# Patient Record
Sex: FEMALE | Race: WHITE
[De-identification: names, ages, dates, MRNs, and addresses within clinical notes are randomized per-mention and may not be internally consistent; named-entity substitution may affect disease eponyms.]

---

## 2017-02-24 ENCOUNTER — HOSPITAL ENCOUNTER (OUTPATIENT)
Dept: HOSPITAL 47 - RADMAMWWP | Age: 47
Discharge: HOME | End: 2017-02-24
Payer: COMMERCIAL

## 2017-02-24 DIAGNOSIS — Z12.31: Primary | ICD-10-CM

## 2017-02-28 NOTE — MM
Reason for exam: screening  (asymptomatic).

Last mammogram was performed 1 year and 2 months ago.



History:

Taking estrogen beginning at age 46.  Taking other hormone beginning at age 46.



Physical Findings:

A clinical breast exam by your physician is recommended on an annual basis and 

results should be correlated with mammographic findings.



MG Screening Mammo w CAD

Bilateral CC and MLO view(s) were taken.

Prior study comparison: December 29, 2015, bilateral MG screening mammo w CAD.  

November 7, 2014, bilateral MG screening mammo w CAD.

There are scattered fibroglandular densities.  No significant changes when 

compared with prior studies.





ASSESSMENT: Negative, BI-RAD 1



RECOMMENDATION:

Routine screening mammogram of both breasts in 1 year.

## 2017-04-04 ENCOUNTER — HOSPITAL ENCOUNTER (OUTPATIENT)
Dept: HOSPITAL 47 - RADUSWWP | Age: 47
Discharge: HOME | End: 2017-04-04
Payer: COMMERCIAL

## 2017-04-04 DIAGNOSIS — M79.662: Primary | ICD-10-CM

## 2017-04-04 NOTE — US
EXAMINATION TYPE: US venous doppler duplex LE LT

 

DATE OF EXAM: 4/4/2017 8:36 AM

 

COMPARISON: NONE

 

CLINICAL HISTORY: Left Calf Pain, M79.669. No h/o dvt

 

SIDE PERFORMED: Left  

 

VESSELS IMAGED:

External Iliac Vein (EIV)

Common Femoral Vein

Deep Femoral Vein

Greater Saphenous Vein *

Femoral Vein

Popliteal Vein

Small Saphenous Vein *

Proximal Calf Veins

(* superficial vessels)

 

Left Leg:  Appears negative for DVT

**tech impression given to office @ 8:35

 

No popliteal fossa lesion is seen.

 

IMPRESSION:  

 

EXAMINATION IS NEGATIVE FOR DVT WITHIN THE LEFT LEG.

## 2017-09-14 ENCOUNTER — HOSPITAL ENCOUNTER (OUTPATIENT)
Dept: HOSPITAL 47 - RADCTMAIN | Age: 47
Discharge: HOME | End: 2017-09-14
Payer: COMMERCIAL

## 2017-09-14 DIAGNOSIS — R11.0: ICD-10-CM

## 2017-09-14 DIAGNOSIS — R10.11: Primary | ICD-10-CM

## 2017-09-14 DIAGNOSIS — R10.30: ICD-10-CM

## 2017-09-14 PROCEDURE — 74177 CT ABD & PELVIS W/CONTRAST: CPT

## 2017-09-14 NOTE — CT
EXAMINATION TYPE: CT abdomen pelvis w con

 

DATE OF EXAM: 9/14/2017

 

COMPARISON: 4/3/2014

 

HISTORY: Mid to right sided pain

 

CT DLP: 598.9 mGycm

Automated exposure control for dose reduction was used.

 

TECHNIQUE:  Helical acquisition of images was performed from the lung bases through the pelvis.

 

CONTRAST: 

Performed with Oral Contrast and with IV Contrast, patient injected with 100 mL of Omnipaque 300.

 

FINDINGS: 

 

Lung bases are clear. There is no pleural effusion. There is no pericardial effusion.

 

Liver spleen pancreas appear normal. Bile ducts are not dilated. There are clips from cholecystectomy
.

 

There is no adrenal mass. Kidneys show satisfactory contrast opacification. There is no hydronephrosi
s. There is no retroperitoneal adenopathy. There is no ascites. Bladder distends smoothly. I see no i
ntestinal wall thickening. There are no dilated loops. Appendix appears normal.

 

I see no bony destructive process.

IMPRESSION: 

NEGATIVE CT SCAN OF THE ABDOMEN AND PELVIS. NORMAL APPENDIX. NO CHANGE COMPARED TO OLD EXAM.

## 2017-09-29 ENCOUNTER — HOSPITAL ENCOUNTER (OUTPATIENT)
Dept: HOSPITAL 47 - RADUSWWP | Age: 47
Discharge: HOME | End: 2017-09-29
Payer: COMMERCIAL

## 2017-09-29 DIAGNOSIS — M79.9: Primary | ICD-10-CM

## 2017-09-29 NOTE — US
EXAMINATION TYPE: US mass soft tissue chest/back

 

DATE OF EXAM: 9/29/2017

 

COMPARISON: NONE

 

CLINICAL HISTORY: M799 Soft tissue [id number] Soft tissue disorder. Patient states feeling two palp
able lumps near right clavicle.  Largest has felt for about one month.  Second area comes and goes. 

 

Area of lumps scanned.  Superficial cluster of cystic lesions seen = 1.6 x 1.2 x 0.7 cm.  Contralater
al image taken.  

 

Second area of concern appears within normal limits.   

 

IMPRESSION:  Cluster of cystic lesions versus a single cystic lesion with a septation measuring 1.6 c
m near the right clavicle. This could represent necrotic adenopathy, resolving hematoma/seroma, or le
ss likely abscesses as no internal vascularity is present. Further evaluation with CT neck/chest with
 and without contrast is recommended for characterization.

## 2017-10-12 ENCOUNTER — HOSPITAL ENCOUNTER (OUTPATIENT)
Dept: HOSPITAL 47 - RADCTMAIN | Age: 47
Discharge: HOME | End: 2017-10-12
Payer: COMMERCIAL

## 2017-10-12 DIAGNOSIS — E04.1: Primary | ICD-10-CM

## 2017-10-12 DIAGNOSIS — D16.7: ICD-10-CM

## 2017-10-12 PROCEDURE — 70491 CT SOFT TISSUE NECK W/DYE: CPT

## 2017-10-12 PROCEDURE — 71260 CT THORAX DX C+: CPT

## 2017-10-12 NOTE — CT
EXAMINATION TYPE: CT neck chest w con

 

DATE OF EXAM: 10/12/2017 7:23 PM

 

COMPARISON: NONE

 

HISTORY: Masses to right clavicular region marked by BBs.

 

CT DLP: 1132.00 mGycm

Automated exposure control for dose reduction was used.

 

CONTRAST: 

CT scan of the neck is performed following with IV Contrast, patient injected with 100 mL of Omnipaqu
e 300.  Axial images are obtained, coronal and sagittal reformatted images are reviewed.

 

FINDINGS:

 

The parotid glands are symmetric. Submandibular salivary glands are symmetric. There is no sign of ph
aryngeal mass. There is a 1 cm cyst in the posterior left thyroid lobe. Trachea appears normal. There
 is normal contrast opacification of the carotid arteries and jugular veins. There is normal contrast
 opacification of the vertebral arteries. The tonsils and adenoids appear normal.

 

The lungs are clear of infiltrate. There is no evidence of a pulmonary mass. There is no pleural effu
merry. Heart size is normal. There is no mediastinal adenopathy. There is no sign of aortic aneurysm o
r dissection. Ascending aorta is top normal in size and measures 3.5 cm. There are markers placed ove
r the anterior lateral right clavicle. I see no bony destructive process. The shoulder joint appears 
intact. There is no evidence of axillary adenopathy. I see no evidence of a fracture.

 

IMPRESSION:  Small cyst in the left thyroid lobe. No discrete neck mass.

 

No evidence of abnormality associated with the right clavicle. negative CT scan of the chest.

## 2017-11-30 ENCOUNTER — HOSPITAL ENCOUNTER (OUTPATIENT)
Dept: HOSPITAL 47 - RADMAMWWP | Age: 47
End: 2017-11-30
Payer: COMMERCIAL

## 2017-11-30 DIAGNOSIS — Z53.9: Primary | ICD-10-CM

## 2018-02-09 ENCOUNTER — HOSPITAL ENCOUNTER (OUTPATIENT)
Dept: HOSPITAL 47 - RADUSWWP | Age: 48
Discharge: HOME | End: 2018-02-09
Payer: COMMERCIAL

## 2018-02-09 DIAGNOSIS — E04.2: Primary | ICD-10-CM

## 2018-02-09 PROCEDURE — 76536 US EXAM OF HEAD AND NECK: CPT

## 2018-02-09 NOTE — US
EXAMINATION TYPE: US thyroid st tissue head/neck

 

DATE OF EXAM: 2/9/2018

 

COMPARISON: NONE

 

CLINICAL HISTORY: E06.9 Thyroiditis unspecified E04.1 Thyroid nodule. Thyroid nodule seen on recent C
T

 

GLAND SIZE:

 

Right Lobe: 5.1 x 1.6 x 1.8 cm

** Overall Parenchyma:  heterogenous

Left Lobe: 5.3 x 1.6 x 1.5 cm

** Overall Parenchyma:  heterogeneous

Isthmus Thickness:  0.3 cm

 

NODULES

 

RIGHT:   # of nodules measured on right: 1

1.  0.9 X 0.7  x 0.7 cm  solid nodule at the mid pole with well-defined margins.  This nodule is wide
r than tall and shows intranodular vascularity. This is hyperechoic

** Prior size: no prior 

 

 

LEFT:    # of nodules measured on left:  1

1.  1.0 X 1.1  x 1.1 cm mixed nodule at the mid pole with well-defined margins.  This nodule is wider
 than tall and shows intranodular vascularity.

** Prior size: no prior 

 

 

ISTHMUS:    # of nodules measured in the isthmus:  0

 

 

Bilateral neck scanned, no evidence of lymphadenopathy.

 

Extremely heterogeneous thyroid gland. 1 nodule measured bilaterally. 

 

 

 

IMPRESSION:

 

1. There is a nodule within the left lobe thyroid greater than 1 cm. The right lobe thyroid nodule me
asures less than 1 cm. 

2.  Heterogenous appearing thyroid.

## 2018-02-16 ENCOUNTER — HOSPITAL ENCOUNTER (OUTPATIENT)
Dept: HOSPITAL 47 - RADPROMAIN | Age: 48
Discharge: HOME | End: 2018-02-16
Payer: COMMERCIAL

## 2018-02-16 DIAGNOSIS — E04.1: Primary | ICD-10-CM

## 2018-02-16 PROCEDURE — 76942 ECHO GUIDE FOR BIOPSY: CPT

## 2018-02-16 PROCEDURE — 88305 TISSUE EXAM BY PATHOLOGIST: CPT

## 2018-02-16 PROCEDURE — 10022: CPT

## 2018-02-16 PROCEDURE — 88173 CYTOPATH EVAL FNA REPORT: CPT

## 2018-02-16 NOTE — US
EXAMINATION TYPE: US FNA thyroid

 

DATE OF EXAM: 2/16/2018

 

COMPARISON: NONE

 

HISTORY: Thyroid nodule on the left.

 

Maximal barrier technique was utilized.  After informed consent, skin overlying the lesion was locali
zed with ultrasound and the overlying skin prepped and draped. Ultrasound was utilized using sterile 
technique. Lidocaine was used for local anesthesia.  4 passes with a 25-gauge needle were made into t
he nodule and aspirated specimen was submitted to cytology.  Following the procedure hemostasis achie
sienna.  No immediate complication.  The patient discharged in stable condition.

 

IMPRESSION: STATUS POST ULTRASOUND GUIDED FINE NEEDLE ASPIRATION OF THYROID NODULE, PATHOLOGY IS PEND
ING.  THIS PROCEDURE WAS PERFORMED BY THE UNDERSIGNED.

## 2018-02-16 NOTE — US
EXAMINATION TYPE: US FNA thyroid

 

DATE OF EXAM: 2/16/2018

 

COMPARISON: NONE

 

HISTORY: Thyroid nodule on the right.

 

Maximal barrier technique was utilized.  After informed consent, skin overlying the lesion was locali
zed with ultrasound and the overlying skin prepped and draped. Ultrasound was utilized using sterile 
technique. Lidocaine was used for local anesthesia.  4 passes with a 25-gauge needle were made into t
he nodule and aspirated specimen was submitted to cytology.  Following the procedure hemostasis achie
sienna.  No immediate complication.  The patient discharged in stable condition.

 

IMPRESSION: STATUS POST ULTRASOUND GUIDED FINE NEEDLE ASPIRATION OF THYROID NODULE, PATHOLOGY IS PEND
ING.  THIS PROCEDURE WAS PERFORMED BY THE UNDERSIGNED.

## 2018-10-24 ENCOUNTER — HOSPITAL ENCOUNTER (OUTPATIENT)
Dept: HOSPITAL 47 - RADMAMWWP | Age: 48
Discharge: HOME | End: 2018-10-24
Attending: OBSTETRICS & GYNECOLOGY
Payer: COMMERCIAL

## 2018-10-24 DIAGNOSIS — Z12.31: Primary | ICD-10-CM

## 2018-10-24 PROCEDURE — 77067 SCR MAMMO BI INCL CAD: CPT

## 2018-10-24 NOTE — MM
Reason for exam: screening  (asymptomatic).

Last mammogram was performed 1 year and 8 months ago.



History:

Taking estrogen beginning at age 46.  Taking other hormone beginning at age 46.



Physical Findings:

A clinical breast exam by your physician is recommended on an annual basis and 

results should be correlated with mammographic findings.



MG Screening Mammo w CAD

Bilateral CC and MLO view(s) were taken.

Prior study comparison: February 24, 2017, bilateral MG screening mammo w CAD.  

December 29, 2015, bilateral MG screening mammo w CAD.

There are scattered fibroglandular densities.  No suspicious abnormality.  No 

significant changes when compared with prior studies.





ASSESSMENT: Negative, BI-RAD 1



RECOMMENDATION:

Routine screening mammogram of both breasts in 1 year.